# Patient Record
Sex: FEMALE | Race: WHITE | NOT HISPANIC OR LATINO | URBAN - METROPOLITAN AREA
[De-identification: names, ages, dates, MRNs, and addresses within clinical notes are randomized per-mention and may not be internally consistent; named-entity substitution may affect disease eponyms.]

---

## 2018-01-12 NOTE — MISCELLANEOUS
Message   Recorded as Task   Date: 05/25/2016 03:09 PM, Created By: Darci Knowles   Task Name: Med Renewal Request   Assigned To: Zachary Noriega   Regarding Patient: Dariela Clement, Status: Active   Comment:    Darci Knowles - 25 May 2016 3:09 PM     TASK CREATED  Caller: Self; Renew Medication; [phone] (Mobile Phone)  pt called - schd yearly with [facility] for November - needs her BC (Norgestrim) called [facility] to last her till she comes in   [address] [place] [address] - 25 May 2016 3:33 PM     TASK EDITED  send renewal to [facility] in Nov        Active Problems    1  Cervicitis (616 0) (N72)   2  Contraceptives (V25 02)   3  Encounter for routine gynecological examination (V72 31) (Z01 419)   4  Exposure to STD (V01 6) (Z20 2)    Current Meds   1  Norgestim-Eth Estrad Triphasic 0 18/0 215/0 25 MG-35 MCG Oral Tablet; take 1 tablet   every day as directed  Requested for: [id number]; Last Rx:[id number]; Status: ACTIVE -   Renewal Denied Ordered    Allergies    1   No Known Drug Allergies    Plan  Contraceptives    · Norgestim-Eth Estrad Triphasic 0 18/0 215/0 25 MG-35 MCG Oral Tablet; take 1  tablet every day as directed    Signatures   Electronically signed by : Ariadna Wang, ; May 25 2016  3:33PM EST                       (Author)

## 2023-08-21 ENCOUNTER — OFFICE VISIT (OUTPATIENT)
Age: 33
End: 2023-08-21
Payer: COMMERCIAL

## 2023-08-21 VITALS
BODY MASS INDEX: 25.13 KG/M2 | TEMPERATURE: 98 F | RESPIRATION RATE: 18 BRPM | SYSTOLIC BLOOD PRESSURE: 120 MMHG | DIASTOLIC BLOOD PRESSURE: 78 MMHG | OXYGEN SATURATION: 100 % | HEART RATE: 89 BPM | WEIGHT: 133 LBS

## 2023-08-21 DIAGNOSIS — B37.0 THRUSH: Primary | ICD-10-CM

## 2023-08-21 PROCEDURE — 99213 OFFICE O/P EST LOW 20 MIN: CPT | Performed by: PHYSICIAN ASSISTANT

## 2023-08-21 RX ORDER — CLOTRIMAZOLE 10 MG/1
10 LOZENGE ORAL; TOPICAL
Qty: 70 TABLET | Refills: 0 | Status: SHIPPED | OUTPATIENT
Start: 2023-08-21 | End: 2023-09-04

## 2023-08-21 NOTE — PATIENT INSTRUCTIONS
Oral Candidiasis   WHAT YOU NEED TO KNOW:   Oral candidiasis, or thrush, is a fungal infection that affects the inside of your mouth. DISCHARGE INSTRUCTIONS:   Return to the emergency department if:   You have trouble swallowing and your jaw and neck are stiff. You are dizzy, thirsty, or have a dry mouth. You are urinating little or not at all. You cannot eat or drink because of the pain. Contact your healthcare provider if:   You have a fever. You have nausea, vomiting, or diarrhea. Your signs and symptoms get worse, even after treatment. You have questions or concerns about your condition or care. Medicines:   Antifungal medicine  helps kill the fungus that caused your oral candidiasis. This medicine may be a pill or a solution that you gargle. Remove dentures before you gargle. Take your medicine as directed. Contact your healthcare provider if you think your medicine is not helping or if you have side effects. Tell your provider if you are allergic to any medicine. Keep a list of the medicines, vitamins, and herbs you take. Include the amounts, and when and why you take them. Bring the list or the pill bottles to follow-up visits. Carry your medicine list with you in case of an emergency. Prevent oral candidiasis:  Brush your teeth, gums, and tongue after you eat and before you go to sleep. Use a toothbrush with soft bristles. See your dentist for regular exams. Remove your dentures when you sleep, or at least 6 hours each day. Clean your dentures and soak them in denture . Let them air dry after soaking. Follow up with your doctor as directed:  Write down your questions so you remember to ask them during your visits. © Copyright [employer]ster 2022 Information is for End User's use only and may not be sold, redistributed or otherwise used for commercial purposes. The above information is an  only.  It is not intended as medical advice for individual conditions or treatments. Talk to your doctor, nurse or pharmacist before following any medical regimen to see if it is safe and effective for you.

## 2023-08-21 NOTE — PROGRESS NOTES
[facility]        NAME: Isela Decker is a 28 y.o. female  : 1990    MRN: [id number]  DATE: 2023  TIME: 2:42 PM    Assessment and Plan   Thrush [B37.0]  1. Thrush  clotrimazole (MYCELEX) 10 mg mayra            Patient Instructions       Follow up with PCP in 3-5 days. Proceed to  ER if symptoms worsen. Chief Complaint     Chief Complaint   Patient presents with   • Oral Pain     Patient states about 3 weeks ago she noticed bumps on her tongue, patient just had an annual done and was std tested. Patient was clear of all things, but isd still uncomfortable, she also noticed towards the back of her mouth theres a white film on her tongue. History of Present Illness       Patient is a 76-year-old female with complaint of sore throat for over 3 months associated with a white film developing over the tongue and back of the throat. She denies fever chills malaise headaches nausea vomiting abdominal pain or weakness. Review of Systems   Review of Systems   Constitutional: Positive for appetite change. Negative for activity change, fatigue and fever. HENT: Positive for sore throat. Respiratory: Negative for cough. Cardiovascular: Negative for chest pain and palpitations. Gastrointestinal: Negative for abdominal pain and nausea. Musculoskeletal: Negative for myalgias. Skin: Negative for rash. Neurological: Negative for dizziness, weakness, light-headedness and headaches.          Current Medications       Current Outpatient Medications:   •  clotrimazole (MYCELEX) 10 mg mayra, Take 1 tablet (10 mg total) by mouth 5 (five) times a day for 14 days, Disp: 70 tablet, Rfl: 0    Current Allergies     Allergies as of 2023   • (No Known Allergies)            The following portions of the patient's history were reviewed and updated as appropriate: allergies, current medications, past family history, past medical history, past social history, past surgical history and problem list.     History reviewed. No pertinent past medical history. History reviewed. No pertinent surgical history. History reviewed. No pertinent family history. Medications have been verified. Objective   /78   Pulse 89   Temp 98 °F (36.7 °C)   Resp 18   Wt 60.3 kg (133 lb)   SpO2 100%   BMI 25.13 kg/m²        Physical Exam     Physical Exam  Vitals and nursing note reviewed. Constitutional:       General: She is not in acute distress. Appearance: Normal appearance. She is not ill-appearing. HENT:      Right Ear: Tympanic membrane, ear canal and external ear normal.      Left Ear: Tympanic membrane, ear canal and external ear normal.      Nose: Nose normal.      Mouth/Throat:      Mouth: Mucous membranes are moist.      Pharynx: Posterior oropharyngeal erythema present. No oropharyngeal exudate. Comments: White film noted over the tongue and posterior throat. Isolated papular erythematous lesions noted in the hard palate. Eyes:      General: No scleral icterus. Extraocular Movements: Extraocular movements intact. Conjunctiva/sclera: Conjunctivae normal.      Pupils: Pupils are equal, round, and reactive to light. Cardiovascular:      Rate and Rhythm: Normal rate and regular rhythm. Pulses: Normal pulses. Pulmonary:      Effort: Pulmonary effort is normal. No respiratory distress. Breath sounds: Normal breath sounds. Musculoskeletal:         General: Normal range of motion. Cervical back: Normal range of motion and neck supple. No tenderness. Lymphadenopathy:      Cervical: No cervical adenopathy. Skin:     General: Skin is warm and dry. Neurological:      General: No focal deficit present. Mental Status: She is alert and oriented to person, place, and time.       Coordination: Coordination normal.      Gait: Gait normal.   Psychiatric:         Mood and Affect: Mood normal.         Behavior: Behavior normal.         Thought Content:  Thought content normal.         Judgment: Judgment normal.

## 2023-10-02 ENCOUNTER — OFFICE VISIT (OUTPATIENT)
Age: 33
End: 2023-10-02
Payer: COMMERCIAL

## 2023-10-02 VITALS
BODY MASS INDEX: 25.43 KG/M2 | WEIGHT: 134.6 LBS | SYSTOLIC BLOOD PRESSURE: 118 MMHG | DIASTOLIC BLOOD PRESSURE: 78 MMHG | HEART RATE: 73 BPM | RESPIRATION RATE: 20 BRPM | OXYGEN SATURATION: 100 % | TEMPERATURE: 97.7 F

## 2023-10-02 DIAGNOSIS — B37.0 THRUSH: Primary | ICD-10-CM

## 2023-10-02 PROCEDURE — 99213 OFFICE O/P EST LOW 20 MIN: CPT | Performed by: PHYSICIAN ASSISTANT

## 2023-10-02 RX ORDER — CLOTRIMAZOLE 10 MG/1
10 LOZENGE ORAL; TOPICAL 4 TIMES DAILY
Qty: 40 TABLET | Refills: 0 | Status: SHIPPED | OUTPATIENT
Start: 2023-10-02 | End: 2023-10-12

## 2023-10-02 RX ORDER — NORGESTIMATE AND ETHINYL ESTRADIOL 7DAYSX3 28
1 KIT ORAL DAILY
COMMUNITY

## 2023-10-02 NOTE — PROGRESS NOTES
[facility]        NAME: Leena Jeffries is a 28 y.o. female  : 1990    MRN: [id number]  DATE: 2023  TIME: 6:09 PM    Assessment and Plan   Thrush [B37.0]  1. Thrush  clotrimazole (MYCELEX) 10 mg mayra            Patient Instructions     Please follow-up with your primary care doctor if your symptoms persist      Chief Complaint   No chief complaint on file. History of Present Illness       Malina Slater is a pleasant 78-year-old female is presenting today with complaint of a possible return of her oral thrush which was treated several weeks ago. Patient is reporting irritability, discomfort when swallowing and lesions similar to as her initial visit. Review of Systems   Review of Systems   Constitutional: Negative for activity change, appetite change, chills, fatigue and fever. Respiratory: Negative for cough. Cardiovascular: Negative for chest pain and palpitations. Gastrointestinal: Negative for abdominal pain, diarrhea, nausea and vomiting. Musculoskeletal: Negative for myalgias. Neurological: Negative for dizziness, light-headedness and headaches. Current Medications       Current Outpatient Medications:   •  clotrimazole (MYCELEX) 10 mg mayra, Take 1 tablet (10 mg total) by mouth 4 (four) times a day for 10 days, Disp: 40 tablet, Rfl: 0  •  norgestimate-ethinyl estradiol (ORTHO TRI-CYCLEN,TRINESSA) 0.18/0.215/0.25 MG-35 MCG per tablet, Take 1 tablet by mouth daily, Disp: , Rfl:     Current Allergies     Allergies as of 10/02/2023   • (No Known Allergies)            The following portions of the patient's history were reviewed and updated as appropriate: allergies, current medications, past family history, past medical history, past social history, past surgical history and problem list.     History reviewed. No pertinent past medical history. History reviewed. No pertinent surgical history. History reviewed.  No pertinent family history. Medications have been verified. Objective   /78   Pulse 73   Temp 97.7 °F (36.5 °C)   Resp 20   Wt 61.1 kg (134 lb 9.6 oz)   SpO2 100%   BMI 25.43 kg/m²        Physical Exam     Physical Exam  Vitals and nursing note reviewed. Constitutional:       General: She is not in acute distress. Appearance: Normal appearance. She is not ill-appearing. HENT:      Right Ear: Tympanic membrane, ear canal and external ear normal.      Left Ear: Tympanic membrane, ear canal and external ear normal.      Nose: Nose normal. No congestion or rhinorrhea. Mouth/Throat:      Mouth: Mucous membranes are moist.      Comments: Erythematous posterior pharynx with papular lesions   Eyes:      Extraocular Movements: Extraocular movements intact. Conjunctiva/sclera: Conjunctivae normal.      Pupils: Pupils are equal, round, and reactive to light. Cardiovascular:      Rate and Rhythm: Normal rate and regular rhythm. Heart sounds: Normal heart sounds. Pulmonary:      Effort: Pulmonary effort is normal.      Breath sounds: Normal breath sounds. Musculoskeletal:         General: Normal range of motion. Cervical back: Normal range of motion and neck supple. Skin:     General: Skin is warm and dry. Neurological:      Mental Status: She is alert and oriented to person, place, and time. Coordination: Coordination normal.      Gait: Gait normal.   Psychiatric:         Mood and Affect: Mood normal.         Behavior: Behavior normal.         Thought Content:  Thought content normal.         Judgment: Judgment normal.

## 2023-10-02 NOTE — PATIENT INSTRUCTIONS
Oral Candidiasis   WHAT YOU NEED TO KNOW:   Oral candidiasis, or thrush, is a fungal infection that affects the inside of your mouth. DISCHARGE INSTRUCTIONS:   Return to the emergency department if:   You have trouble swallowing and your jaw and neck are stiff. You are dizzy, thirsty, or have a dry mouth. You are urinating little or not at all. You cannot eat or drink because of the pain. Contact your healthcare provider if:   You have a fever. You have nausea, vomiting, or diarrhea. Your signs and symptoms get worse, even after treatment. You have questions or concerns about your condition or care. Medicines:   Antifungal medicine  helps kill the fungus that caused your oral candidiasis. This medicine may be a pill or a solution that you gargle. Remove dentures before you gargle. Take your medicine as directed. Contact your healthcare provider if you think your medicine is not helping or if you have side effects. Tell your provider if you are allergic to any medicine. Keep a list of the medicines, vitamins, and herbs you take. Include the amounts, and when and why you take them. Bring the list or the pill bottles to follow-up visits. Carry your medicine list with you in case of an emergency. Prevent oral candidiasis:  Brush your teeth, gums, and tongue after you eat and before you go to sleep. Use a toothbrush with soft bristles. See your dentist for regular exams. Remove your dentures when you sleep, or at least 6 hours each day. Clean your dentures and soak them in denture . Let them air dry after soaking. Follow up with your doctor as directed:  Write down your questions so you remember to ask them during your visits. © Copyright [employer] 2023 Information is for End User's use only and may not be sold, redistributed or otherwise used for commercial purposes. The above information is an  only.  It is not intended as medical advice for individual conditions or treatments. Talk to your doctor, nurse or pharmacist before following any medical regimen to see if it is safe and effective for you.